# Patient Record
Sex: MALE | Race: WHITE | NOT HISPANIC OR LATINO | URBAN - METROPOLITAN AREA
[De-identification: names, ages, dates, MRNs, and addresses within clinical notes are randomized per-mention and may not be internally consistent; named-entity substitution may affect disease eponyms.]

---

## 2020-03-04 ENCOUNTER — APPOINTMENT (RX ONLY)
Dept: URBAN - METROPOLITAN AREA CLINIC 12 | Facility: CLINIC | Age: 55
Setting detail: DERMATOLOGY
End: 2020-03-04

## 2020-03-04 DIAGNOSIS — Z41.9 ENCOUNTER FOR PROCEDURE FOR PURPOSES OTHER THAN REMEDYING HEALTH STATE, UNSPECIFIED: ICD-10-CM

## 2020-03-04 PROCEDURE — ? CONSULTATION - AESTHETIC FACIAL DEFORMITY

## 2020-03-04 PROCEDURE — ? PATIENT SPECIFIC COUNSELING

## 2020-03-04 PROCEDURE — ? BOTOX

## 2020-03-04 NOTE — PROCEDURE: BOTOX
Postcare Instructions: Patient instructed to not lie down for 4 hours and limit physical activity for 24 hours. Patient instructed not to travel by airplane for 48 hours.
Lot #: [id number]
Administered By (Optional): Yolanda Lewis PA-C
Show Price In Note?: yes
Map Statment: See attached map for complete details
Consent: Written consent was obtained prior to the procedure. Risks, benefits, expectations and alternatives were discussed including, but not limited to, infection, bleeding, lid/brow ptosis, bruising, swelling, diplopia, temporary effects, incomplete chemical denervation and dissatisfaction with the cosmetic outcome. No guarantee or warranty was given or implied regarding longevity of results.
Expiration Date (Month Year): 08/22
Price Per Unit In $ (Use Numbers Only, No Text Please.): 12
Glabellar Complex Units: 20
Perioral Units: 0
Forehead Units: 12.5
Dilution (U/0.1 Cc): 5
Bill Summary Price Listed Below, Or Bill Total Of Units X Price Per Unit?: Bill #Units x Price Per Unit
Detail Level: Simple

## 2020-03-04 NOTE — HPI: COSMETIC (BOTOX)
Have You Had Botox?: has had botox
Have You Had Dysport?: has had Dysport
Additional History: Patient states it has been a few years since last injections. Typically needs stronger doses. No issues in the past with drooping brows.

## 2020-03-04 NOTE — PROCEDURE: PATIENT SPECIFIC COUNSELING
Detail Level: Zone
Other (Free Text): Advised patient that due to heavy, ptotic eyebrows and his natural tendency toward subconsciously keeping his eyebrows in a raised position at rest, it's best to be conservative with forehead botox injections. Otherwise, eyebrows will feel heavier and he will not be able to contract frontalis to help. Dr. Parmar stepped in to examine and advise patient as well - he agreed with all of the above - advised patient that he is a good candidate for a browlift at some point in the future. Administered conservative amount of forehead and glabellar botox today (see map for details/sites) and advised to follow up in 2 weeks to check.

## 2020-04-27 ENCOUNTER — APPOINTMENT (RX ONLY)
Dept: URBAN - METROPOLITAN AREA CLINIC 12 | Facility: CLINIC | Age: 55
Setting detail: DERMATOLOGY
End: 2020-04-27

## 2020-04-27 DIAGNOSIS — Z42.8 ENCOUNTER FOR OTHER PLASTIC AND RECONSTRUCTIVE SURGERY FOLLOWING MEDICAL PROCEDURE OR HEALED INJURY: ICD-10-CM | Status: IMPROVED

## 2020-04-27 DIAGNOSIS — Z41.9 ENCOUNTER FOR PROCEDURE FOR PURPOSES OTHER THAN REMEDYING HEALTH STATE, UNSPECIFIED: ICD-10-CM | Status: IMPROVED

## 2020-04-27 PROCEDURE — ? PATIENT SPECIFIC COUNSELING

## 2020-04-27 PROCEDURE — ? BOTOX

## 2020-04-27 NOTE — PROCEDURE: PATIENT SPECIFIC COUNSELING
Other (Free Text): Patient had Botox done 3/4/20 but his 2 week follow up was cancelled due to coronavirus office closure. Here today for check / more Botox. Rhytids improved but still significant amt of residual rhytids in all areas. Dr. Parmar stepped in to advise as well- recommend 15 units glabella, 15 forehead, and 5 right upper forehead. Rtc 2 weeks for botox check
Detail Level: Zone

## 2020-04-27 NOTE — PROCEDURE: BOTOX
Additional Area 3 Units: 0
Show Price In Note?: yes
Price Per Unit In $ (Use Numbers Only, No Text Please.): 12
Postcare Instructions: Patient instructed to not lie down for 4 hours and limit physical activity for 24 hours. Patient instructed not to travel by airplane for 48 hours.
Forehead Units: 20
Bill Summary Price Listed Below, Or Bill Total Of Units X Price Per Unit?: Bill #Units x Price Per Unit
Consent: Written consent was obtained prior to the procedure. Risks, benefits, expectations and alternatives were discussed including, but not limited to, infection, bleeding, lid/brow ptosis, bruising, swelling, diplopia, temporary effects, incomplete chemical denervation and dissatisfaction with the cosmetic outcome. No guarantee or warranty was given or implied regarding longevity of results.
Expiration Date (Month Year): 10/22
Dilution (U/0.1 Cc): 5
Administered By (Optional): Yolanda Lewis PA-C
Detail Level: Simple
Map Statment: See attached map for complete details
Lot #: [id number]
Glabellar Complex Units: 15

## 2020-05-11 ENCOUNTER — APPOINTMENT (RX ONLY)
Dept: URBAN - METROPOLITAN AREA OTHER 4 | Facility: OTHER | Age: 55
Setting detail: DERMATOLOGY
End: 2020-05-11

## 2020-05-11 ENCOUNTER — RX ONLY (OUTPATIENT)
Age: 55
Setting detail: RX ONLY
End: 2020-05-11

## 2020-05-11 ENCOUNTER — APPOINTMENT (RX ONLY)
Dept: URBAN - METROPOLITAN AREA CLINIC 12 | Facility: CLINIC | Age: 55
Setting detail: DERMATOLOGY
End: 2020-05-11

## 2020-05-11 DIAGNOSIS — L21.8 OTHER SEBORRHEIC DERMATITIS: ICD-10-CM

## 2020-05-11 DIAGNOSIS — Z42.8 ENCOUNTER FOR OTHER PLASTIC AND RECONSTRUCTIVE SURGERY FOLLOWING MEDICAL PROCEDURE OR HEALED INJURY: ICD-10-CM

## 2020-05-11 DIAGNOSIS — I78.8 OTHER DISEASES OF CAPILLARIES: ICD-10-CM

## 2020-05-11 DIAGNOSIS — Z41.9 ENCOUNTER FOR PROCEDURE FOR PURPOSES OTHER THAN REMEDYING HEALTH STATE, UNSPECIFIED: ICD-10-CM

## 2020-05-11 DIAGNOSIS — L57.8 OTHER SKIN CHANGES DUE TO CHRONIC EXPOSURE TO NONIONIZING RADIATION: ICD-10-CM

## 2020-05-11 PROCEDURE — ? BOTOX

## 2020-05-11 PROCEDURE — ? COUNSELING

## 2020-05-11 PROCEDURE — ? OTHER

## 2020-05-11 PROCEDURE — ? PATIENT SPECIFIC COUNSELING

## 2020-05-11 PROCEDURE — 99213 OFFICE O/P EST LOW 20 MIN: CPT

## 2020-05-11 RX ORDER — FLUOROURACIL 2 G/40G
CREAM TOPICAL
Qty: 1 | Refills: 1 | COMMUNITY
Start: 2020-05-11

## 2020-05-11 ASSESSMENT — LOCATION SIMPLE DESCRIPTION DERM
LOCATION SIMPLE: RIGHT FOREHEAD
LOCATION SIMPLE: RIGHT CHEEK
LOCATION SIMPLE: LEFT CHEEK

## 2020-05-11 ASSESSMENT — LOCATION ZONE DERM: LOCATION ZONE: FACE

## 2020-05-11 ASSESSMENT — LOCATION DETAILED DESCRIPTION DERM
LOCATION DETAILED: RIGHT SUPERIOR MEDIAL FOREHEAD
LOCATION DETAILED: LEFT CENTRAL MALAR CHEEK
LOCATION DETAILED: RIGHT CENTRAL MALAR CHEEK

## 2020-05-11 NOTE — PROCEDURE: OTHER
Note Text (......Xxx Chief Complaint.): This diagnosis correlates with the
Detail Level: Detailed
Other (Free Text): Efudex prescribed today. Pt advised to apply thin film to the affected area of the forehead QHS for 2 weeks. Gus discussed with the patient that he may also apply Efudex to his forearms at a laterdate

## 2020-05-11 NOTE — PROCEDURE: PATIENT SPECIFIC COUNSELING
Other (Free Text): Patient presents for 2 week follow up. Very pleased with results thus far but has small amount of residual rhytids in right lateral inferior forehead and in glabellar area. Dr. Parmar advised 15 more units to glabellar and 5 more units to right forehead. Follow up 2 weeks if residual rhytids otherwise PRN
Detail Level: Zone

## 2020-05-11 NOTE — PROCEDURE: BOTOX
Periorbital Skin Units: 0
Detail Level: Simple
Lot #: [id number]
Administered By (Optional): Yolanda Lewis PA-C
Price Per Unit In $ (Use Numbers Only, No Text Please.): 12
Use Map Statement For Sites (Optional): Yes
Map Statment: See attached map for complete details
Dilution (U/0.1 Cc): 5
Consent: Written consent was obtained prior to the procedure. Risks, benefits, expectations and alternatives were discussed including, but not limited to, infection, bleeding, lid/brow ptosis, bruising, swelling, diplopia, temporary effects, incomplete chemical denervation and dissatisfaction with the cosmetic outcome. No guarantee or warranty was given or implied regarding longevity of results.
Expiration Date (Month Year): 09/22
Bill Summary Price Listed Below, Or Bill Total Of Units X Price Per Unit?: Bill #Units x Price Per Unit
Glabellar Complex Units: 15
Postcare Instructions: Patient instructed to not lie down for 4 hours and limit physical activity for 24 hours. Patient instructed not to travel by airplane for 48 hours.

## 2020-08-10 ENCOUNTER — APPOINTMENT (RX ONLY)
Dept: URBAN - METROPOLITAN AREA CLINIC 12 | Facility: CLINIC | Age: 55
Setting detail: DERMATOLOGY
End: 2020-08-10

## 2020-08-10 DIAGNOSIS — Z41.9 ENCOUNTER FOR PROCEDURE FOR PURPOSES OTHER THAN REMEDYING HEALTH STATE, UNSPECIFIED: ICD-10-CM

## 2020-08-10 PROCEDURE — ? BOTOX

## 2020-08-10 NOTE — PROCEDURE: BOTOX
Periorbital Skin Units: 0
Detail Level: Simple
Lot #: [id number]
Administered By (Optional): Yolanda Lewis PA-C
Price Per Unit In $ (Use Numbers Only, No Text Please.): 12
Use Map Statement For Sites (Optional): Yes
Map Statment: See attached map for complete details
Dilution (U/0.1 Cc): 5
Consent: Written consent was obtained prior to the procedure. Risks, benefits, expectations and alternatives were discussed including, but not limited to, infection, bleeding, lid/brow ptosis, bruising, swelling, diplopia, temporary effects, incomplete chemical denervation and dissatisfaction with the cosmetic outcome. No guarantee or warranty was given or implied regarding longevity of results.
Expiration Date (Month Year): 04/23
Bill Summary Price Listed Below, Or Bill Total Of Units X Price Per Unit?: Bill #Units x Price Per Unit
Glabellar Complex Units: 30
Postcare Instructions: Patient instructed to not lie down for 4 hours and limit physical activity for 24 hours. Patient instructed not to travel by airplane for 48 hours.
Forehead Units: 25

## 2020-11-25 ENCOUNTER — APPOINTMENT (RX ONLY)
Dept: URBAN - METROPOLITAN AREA OTHER 9 | Facility: OTHER | Age: 55
Setting detail: DERMATOLOGY
End: 2020-11-25

## 2020-11-25 DIAGNOSIS — Z41.9 ENCOUNTER FOR PROCEDURE FOR PURPOSES OTHER THAN REMEDYING HEALTH STATE, UNSPECIFIED: ICD-10-CM

## 2020-11-25 PROCEDURE — ? BOTOX

## 2020-11-25 NOTE — PROCEDURE: BOTOX
Periorbital Skin Units: 0
Detail Level: Simple
Lot #: [id number]
Administered By (Optional): Yolanda Lewis PA-C
Price Per Unit In $ (Use Numbers Only, No Text Please.): 12
Use Map Statement For Sites (Optional): Yes
Map Statment: See attached map for complete details
Dilution (U/0.1 Cc): 5
Consent: Written consent was obtained prior to the procedure. Risks, benefits, expectations and alternatives were discussed including, but not limited to, infection, bleeding, lid/brow ptosis, bruising, swelling, diplopia, temporary effects, incomplete chemical denervation and dissatisfaction with the cosmetic outcome. No guarantee or warranty was given or implied regarding longevity of results.
Expiration Date (Month Year): 05/22
Bill Summary Price Listed Below, Or Bill Total Of Units X Price Per Unit?: Bill #Units x Price Per Unit
Glabellar Complex Units: 30
Postcare Instructions: Patient instructed to not lie down for 4 hours and limit physical activity for 24 hours. Patient instructed not to travel by airplane for 48 hours.
Forehead Units: 25

## 2021-02-08 ENCOUNTER — APPOINTMENT (RX ONLY)
Dept: URBAN - METROPOLITAN AREA OTHER 9 | Facility: OTHER | Age: 56
Setting detail: DERMATOLOGY
End: 2021-02-08

## 2021-02-08 DIAGNOSIS — L98.8 OTHER SPECIFIED DISORDERS OF THE SKIN AND SUBCUTANEOUS TISSUE: ICD-10-CM

## 2021-02-08 PROCEDURE — ? BOTOX

## 2021-02-08 ASSESSMENT — LOCATION DETAILED DESCRIPTION DERM: LOCATION DETAILED: RIGHT MEDIAL FOREHEAD

## 2021-02-08 ASSESSMENT — LOCATION SIMPLE DESCRIPTION DERM: LOCATION SIMPLE: RIGHT FOREHEAD

## 2021-02-08 ASSESSMENT — LOCATION ZONE DERM: LOCATION ZONE: FACE

## 2021-02-08 NOTE — PROCEDURE: BOTOX
Show Additional Area 4: Yes
Additional Area 4 Units: 0
Show Ucl Units: No
Price (Use Numbers Only, No Special Characters Or $): 384
Lot #: [id number]
Dilution (U/0.1 Cc): 5
Forehead Units: 12
Consent: Written consent obtained. Risks include but not limited to lid/brow ptosis, bruising, swelling, diplopia, temporary effect, incomplete chemical denervation.
Post-Care Instructions: Patient instructed to not lie down for 4 hours and limit physical activity for 24 hours.
Glabellar Complex Units: 20
Expiration Date (Month Year): 09/2023
Detail Level: Detailed